# Patient Record
Sex: FEMALE | Race: WHITE | NOT HISPANIC OR LATINO | Employment: UNEMPLOYED | URBAN - METROPOLITAN AREA
[De-identification: names, ages, dates, MRNs, and addresses within clinical notes are randomized per-mention and may not be internally consistent; named-entity substitution may affect disease eponyms.]

---

## 2017-01-08 ENCOUNTER — PATIENT MESSAGE (OUTPATIENT)
Dept: ADMINISTRATIVE | Facility: OTHER | Age: 52
End: 2017-01-08

## 2017-01-08 ENCOUNTER — PATIENT MESSAGE (OUTPATIENT)
Dept: INTERNAL MEDICINE | Facility: CLINIC | Age: 52
End: 2017-01-08

## 2017-02-01 ENCOUNTER — TELEPHONE (OUTPATIENT)
Dept: INTERNAL MEDICINE | Facility: CLINIC | Age: 52
End: 2017-02-01

## 2017-03-26 DIAGNOSIS — Z87.11 HISTORY OF GASTRIC ULCER: ICD-10-CM

## 2017-03-26 RX ORDER — PANTOPRAZOLE SODIUM 40 MG/1
TABLET, DELAYED RELEASE ORAL
Qty: 90 TABLET | Refills: 0 | Status: SHIPPED | OUTPATIENT
Start: 2017-03-26 | End: 2017-05-24 | Stop reason: SDUPTHER

## 2017-05-24 DIAGNOSIS — Z87.11 HISTORY OF GASTRIC ULCER: ICD-10-CM

## 2017-05-24 RX ORDER — PANTOPRAZOLE SODIUM 40 MG/1
TABLET, DELAYED RELEASE ORAL
Qty: 90 TABLET | Refills: 1 | Status: SHIPPED | OUTPATIENT
Start: 2017-05-24 | End: 2017-11-20 | Stop reason: SDUPTHER

## 2017-09-07 DIAGNOSIS — E03.9 HYPOTHYROIDISM (ACQUIRED): ICD-10-CM

## 2017-09-07 RX ORDER — LEVOTHYROXINE SODIUM 125 UG/1
TABLET ORAL
Qty: 90 TABLET | Refills: 0 | Status: SHIPPED | OUTPATIENT
Start: 2017-09-07 | End: 2017-12-06 | Stop reason: SDUPTHER

## 2017-09-07 NOTE — TELEPHONE ENCOUNTER
Please inform patient her refill was sent to her mail order pharmacy.  However, she will need to be seen this month or next as she is due for a recheck including lab for thyroid.

## 2017-09-20 ENCOUNTER — APPOINTMENT (OUTPATIENT)
Dept: RADIOLOGY | Facility: HOSPITAL | Age: 52
End: 2017-09-20
Attending: FAMILY MEDICINE
Payer: COMMERCIAL

## 2017-09-20 ENCOUNTER — OFFICE VISIT (OUTPATIENT)
Dept: INTERNAL MEDICINE | Facility: CLINIC | Age: 52
End: 2017-09-20
Payer: COMMERCIAL

## 2017-09-20 VITALS
BODY MASS INDEX: 43.82 KG/M2 | SYSTOLIC BLOOD PRESSURE: 101 MMHG | OXYGEN SATURATION: 97 % | DIASTOLIC BLOOD PRESSURE: 66 MMHG | WEIGHT: 255.31 LBS | HEART RATE: 89 BPM | TEMPERATURE: 98 F

## 2017-09-20 DIAGNOSIS — M79.605 PAIN OF LEFT LOWER EXTREMITY: ICD-10-CM

## 2017-09-20 DIAGNOSIS — M79.605 PAIN OF LEFT LOWER EXTREMITY: Primary | ICD-10-CM

## 2017-09-20 PROCEDURE — 73590 X-RAY EXAM OF LOWER LEG: CPT | Mod: 26,LT,, | Performed by: RADIOLOGY

## 2017-09-20 PROCEDURE — 99999 PR PBB SHADOW E&M-EST. PATIENT-LVL III: CPT | Mod: PBBFAC,,, | Performed by: FAMILY MEDICINE

## 2017-09-20 PROCEDURE — 99213 OFFICE O/P EST LOW 20 MIN: CPT | Mod: S$GLB,,, | Performed by: FAMILY MEDICINE

## 2017-09-20 PROCEDURE — 73590 X-RAY EXAM OF LOWER LEG: CPT | Mod: TC,PO,LT

## 2017-09-20 PROCEDURE — 3008F BODY MASS INDEX DOCD: CPT | Mod: S$GLB,,, | Performed by: FAMILY MEDICINE

## 2017-09-20 RX ORDER — MORPHINE SULFATE 30 MG/1
30 TABLET, FILM COATED, EXTENDED RELEASE ORAL 2 TIMES DAILY
COMMUNITY
Start: 2016-06-16

## 2017-09-20 NOTE — PROGRESS NOTES
Subjective:       Patient ID: Mable Kerr is a 52 y.o. female.    Chief Complaint: left leg pain    She's had 1 week duration of pain and swelling of left lower anterior leg.  She reports started walking on a treadmill for 45-60 min  3-4 weeks ago. She last walked on treadmill about 2 weeks ago.  She denies fever chills.  Denies leg trauma. Medical history includes fibromyalgia as well as lumbar disc with right leg sciatica      Review of Systems   Constitutional: Negative for chills and fever.   Respiratory: Negative for cough, chest tightness and shortness of breath.    Cardiovascular: Negative for chest pain and palpitations.   Musculoskeletal:        Pain swelling left lower anterior leg   Skin: Negative for rash.       Objective:      Physical Exam   Constitutional: She appears well-developed and well-nourished. No distress.   Cardiovascular: Normal rate and regular rhythm.    Pulmonary/Chest: Effort normal.   Musculoskeletal:   She has tenderness with mild erythema localized to the left lower anterior leg.  She has increased pain with dorsiflexion of her foot.  She has no calf tenderness.       Office Visit on 09/29/2016   Component Date Value Ref Range Status    TSH 09/29/2016 0.961  0.400 - 4.000 uIU/mL Final    WBC 09/29/2016 7.79  3.90 - 12.70 K/uL Final    RBC 09/29/2016 4.58  4.00 - 5.40 M/uL Final    Hemoglobin 09/29/2016 12.7  12.0 - 16.0 g/dL Final    Hematocrit 09/29/2016 40.0  37.0 - 48.5 % Final    MCV 09/29/2016 87  82 - 98 fL Final    MCH 09/29/2016 27.7  27.0 - 31.0 pg Final    MCHC 09/29/2016 31.8* 32.0 - 36.0 % Final    RDW 09/29/2016 13.2  11.5 - 14.5 % Final    Platelets 09/29/2016 341  150 - 350 K/uL Final    MPV 09/29/2016 9.4  9.2 - 12.9 fL Final    Gran # 09/29/2016 4.8  1.8 - 7.7 K/uL Final    Lymph # 09/29/2016 2.3  1.0 - 4.8 K/uL Final    Mono # 09/29/2016 0.5  0.3 - 1.0 K/uL Final    Eos # 09/29/2016 0.3  0.0 - 0.5 K/uL Final    Baso # 09/29/2016 0.02   0.00 - 0.20 K/uL Final    Gran% 09/29/2016 61.1  38.0 - 73.0 % Final    Lymph% 09/29/2016 28.9  18.0 - 48.0 % Final    Mono% 09/29/2016 5.9  4.0 - 15.0 % Final    Eosinophil% 09/29/2016 3.7  0.0 - 8.0 % Final    Basophil% 09/29/2016 0.3  0.0 - 1.9 % Final    Differential Method 09/29/2016 Automated   Final    Sodium 09/29/2016 142  136 - 145 mmol/L Final    Potassium 09/29/2016 3.5  3.5 - 5.1 mmol/L Final    Chloride 09/29/2016 102  95 - 110 mmol/L Final    CO2 09/29/2016 30* 23 - 29 mmol/L Final    Glucose 09/29/2016 105  70 - 110 mg/dL Final    BUN, Bld 09/29/2016 7  6 - 20 mg/dL Final    Creatinine 09/29/2016 0.8  0.5 - 1.4 mg/dL Final    Calcium 09/29/2016 8.9  8.7 - 10.5 mg/dL Final    Total Protein 09/29/2016 6.9  6.0 - 8.4 g/dL Final    Albumin 09/29/2016 3.4* 3.5 - 5.2 g/dL Final    Total Bilirubin 09/29/2016 0.3  0.1 - 1.0 mg/dL Final    Alkaline Phosphatase 09/29/2016 107  55 - 135 U/L Final    AST 09/29/2016 16  10 - 40 U/L Final    ALT 09/29/2016 7* 10 - 44 U/L Final    Anion Gap 09/29/2016 10  8 - 16 mmol/L Final    eGFR if African American 09/29/2016 >60.0  >60 mL/min/1.73 m^2 Final    eGFR if non African American 09/29/2016 >60.0  >60 mL/min/1.73 m^2 Final    Iron 09/29/2016 39  30 - 160 ug/dL Final    Ferritin 09/29/2016 17* 20.0 - 300.0 ng/mL Final    Vitamin B-12 09/29/2016 232  210 - 950 pg/mL Final     Assessment:       1. Pain of left lower extremity        Plan:   X-ray lower leg. prob acute myositis- shin splints- Rest  Medrol dosp and ice tid ov 2wk if not resolved   Call if any worsening or fever    Pain of left lower extremity  -     X-Ray Tibia Fibula 2 View Left; Future; Expected date: 09/20/2017

## 2017-09-21 ENCOUNTER — TELEPHONE (OUTPATIENT)
Dept: INTERNAL MEDICINE | Facility: CLINIC | Age: 52
End: 2017-09-21

## 2017-09-21 DIAGNOSIS — M79.605 PAIN OF LEFT LOWER EXTREMITY: Primary | ICD-10-CM

## 2017-09-21 RX ORDER — METHYLPREDNISOLONE 4 MG/1
TABLET ORAL
Qty: 1 PACKAGE | Refills: 0 | Status: SHIPPED | OUTPATIENT
Start: 2017-09-21 | End: 2017-10-12

## 2017-09-21 NOTE — TELEPHONE ENCOUNTER
----- Message from Hollie Dominique sent at 9/20/2017  4:32 PM CDT -----  Contact: Pt  Pt states that pharmacy has received her prescription..needs RX sent.  Request call back ..[phone]

## 2017-10-10 RX ORDER — MONTELUKAST SODIUM 10 MG/1
TABLET ORAL
Qty: 90 TABLET | Refills: 0 | Status: SHIPPED | OUTPATIENT
Start: 2017-10-10 | End: 2017-12-28 | Stop reason: SDUPTHER

## 2017-11-20 DIAGNOSIS — Z87.11 HISTORY OF GASTRIC ULCER: ICD-10-CM

## 2017-11-20 RX ORDER — PANTOPRAZOLE SODIUM 40 MG/1
TABLET, DELAYED RELEASE ORAL
Qty: 90 TABLET | Refills: 0 | Status: SHIPPED | OUTPATIENT
Start: 2017-11-20 | End: 2018-02-18 | Stop reason: SDUPTHER

## 2017-12-06 ENCOUNTER — TELEPHONE (OUTPATIENT)
Dept: INTERNAL MEDICINE | Facility: CLINIC | Age: 52
End: 2017-12-06

## 2017-12-06 DIAGNOSIS — E03.9 HYPOTHYROIDISM (ACQUIRED): ICD-10-CM

## 2017-12-07 RX ORDER — LEVOTHYROXINE SODIUM 125 UG/1
TABLET ORAL
Qty: 90 TABLET | Refills: 0 | Status: SHIPPED | OUTPATIENT
Start: 2017-12-07 | End: 2018-03-06 | Stop reason: SDUPTHER

## 2017-12-08 NOTE — TELEPHONE ENCOUNTER
Please schedule patient for an office visit.  She is due for labs including thyroid.  Her thyroid medication was refilled again for 90 days.

## 2017-12-11 ENCOUNTER — TELEPHONE (OUTPATIENT)
Dept: INTERNAL MEDICINE | Facility: CLINIC | Age: 52
End: 2017-12-11

## 2017-12-11 NOTE — TELEPHONE ENCOUNTER
----- Message from Triston Driver sent at 12/11/2017  9:59 AM CST -----  Contact: self [phone]  Returning call, please call back at [phone]

## 2017-12-11 NOTE — TELEPHONE ENCOUNTER
Returned the patient phone call. Schedule her office visit and lab work on 12/28/17. Pt verbalized understanding of the appointment information given.

## 2017-12-28 ENCOUNTER — TELEPHONE (OUTPATIENT)
Dept: INTERNAL MEDICINE | Facility: CLINIC | Age: 52
End: 2017-12-28

## 2017-12-28 ENCOUNTER — OFFICE VISIT (OUTPATIENT)
Dept: INTERNAL MEDICINE | Facility: CLINIC | Age: 52
End: 2017-12-28
Payer: COMMERCIAL

## 2017-12-28 VITALS
DIASTOLIC BLOOD PRESSURE: 67 MMHG | SYSTOLIC BLOOD PRESSURE: 107 MMHG | BODY MASS INDEX: 43.52 KG/M2 | WEIGHT: 254.94 LBS | OXYGEN SATURATION: 95 % | TEMPERATURE: 98 F | HEIGHT: 64 IN | HEART RATE: 80 BPM

## 2017-12-28 DIAGNOSIS — Z00.00 WELLNESS EXAMINATION: Primary | ICD-10-CM

## 2017-12-28 DIAGNOSIS — R69 TAKING MEDICATION FOR CHRONIC DISEASE: ICD-10-CM

## 2017-12-28 DIAGNOSIS — B97.89 VIRAL SORE THROAT: ICD-10-CM

## 2017-12-28 DIAGNOSIS — Z23 IMMUNIZATION DUE: ICD-10-CM

## 2017-12-28 DIAGNOSIS — R40.0 DAYTIME SOMNOLENCE: ICD-10-CM

## 2017-12-28 DIAGNOSIS — E03.9 HYPOTHYROIDISM (ACQUIRED): ICD-10-CM

## 2017-12-28 DIAGNOSIS — J02.8 VIRAL SORE THROAT: ICD-10-CM

## 2017-12-28 DIAGNOSIS — Z98.84 HISTORY OF GASTRIC RESTRICTIVE SURGERY: ICD-10-CM

## 2017-12-28 DIAGNOSIS — R06.83 SNORES: ICD-10-CM

## 2017-12-28 DIAGNOSIS — J30.89 CHRONIC NON-SEASONAL ALLERGIC RHINITIS, UNSPECIFIED TRIGGER: ICD-10-CM

## 2017-12-28 LAB
ALBUMIN SERPL BCP-MCNC: 3.4 G/DL
ALP SERPL-CCNC: 110 U/L
ALT SERPL W/O P-5'-P-CCNC: 11 U/L
ANION GAP SERPL CALC-SCNC: 8 MMOL/L
AST SERPL-CCNC: 17 U/L
BASOPHILS # BLD AUTO: 0.03 K/UL
BASOPHILS NFR BLD: 0.5 %
BILIRUB SERPL-MCNC: 0.5 MG/DL
BUN SERPL-MCNC: 7 MG/DL
CALCIUM SERPL-MCNC: 9.3 MG/DL
CHLORIDE SERPL-SCNC: 103 MMOL/L
CHOLEST SERPL-MCNC: 202 MG/DL
CHOLEST/HDLC SERPL: 3.4 {RATIO}
CO2 SERPL-SCNC: 32 MMOL/L
CREAT SERPL-MCNC: 0.8 MG/DL
DIFFERENTIAL METHOD: ABNORMAL
EOSINOPHIL # BLD AUTO: 0.3 K/UL
EOSINOPHIL NFR BLD: 4.7 %
ERYTHROCYTE [DISTWIDTH] IN BLOOD BY AUTOMATED COUNT: 13.9 %
EST. GFR  (AFRICAN AMERICAN): >60 ML/MIN/1.73 M^2
EST. GFR  (NON AFRICAN AMERICAN): >60 ML/MIN/1.73 M^2
FERRITIN SERPL-MCNC: 18 NG/ML
GLUCOSE SERPL-MCNC: 84 MG/DL
HCT VFR BLD AUTO: 39.5 %
HDLC SERPL-MCNC: 60 MG/DL
HDLC SERPL: 29.7 %
HGB BLD-MCNC: 12.3 G/DL
IMM GRANULOCYTES # BLD AUTO: 0.02 K/UL
IMM GRANULOCYTES NFR BLD AUTO: 0.3 %
IRON SERPL-MCNC: 37 UG/DL
LDLC SERPL CALC-MCNC: 119 MG/DL
LYMPHOCYTES # BLD AUTO: 1.4 K/UL
LYMPHOCYTES NFR BLD: 23.8 %
MCH RBC QN AUTO: 25.8 PG
MCHC RBC AUTO-ENTMCNC: 31.1 G/DL
MCV RBC AUTO: 83 FL
MONOCYTES # BLD AUTO: 0.3 K/UL
MONOCYTES NFR BLD: 5.4 %
NEUTROPHILS # BLD AUTO: 3.9 K/UL
NEUTROPHILS NFR BLD: 65.3 %
NONHDLC SERPL-MCNC: 142 MG/DL
NRBC BLD-RTO: 0 /100 WBC
PLATELET # BLD AUTO: 348 K/UL
PMV BLD AUTO: 10.2 FL
POTASSIUM SERPL-SCNC: 4.1 MMOL/L
PROT SERPL-MCNC: 7.2 G/DL
RBC # BLD AUTO: 4.77 M/UL
SATURATED IRON: 9 %
SODIUM SERPL-SCNC: 143 MMOL/L
TOTAL IRON BINDING CAPACITY: 406 UG/DL
TRANSFERRIN SERPL-MCNC: 274 MG/DL
TRIGL SERPL-MCNC: 115 MG/DL
TSH SERPL DL<=0.005 MIU/L-ACNC: 0.61 UIU/ML
VIT B12 SERPL-MCNC: 207 PG/ML
WBC # BLD AUTO: 5.93 K/UL

## 2017-12-28 PROCEDURE — 80061 LIPID PANEL: CPT

## 2017-12-28 PROCEDURE — 99999 PR PBB SHADOW E&M-EST. PATIENT-LVL III: CPT | Mod: PBBFAC,,, | Performed by: FAMILY MEDICINE

## 2017-12-28 PROCEDURE — 85025 COMPLETE CBC W/AUTO DIFF WBC: CPT

## 2017-12-28 PROCEDURE — 82728 ASSAY OF FERRITIN: CPT

## 2017-12-28 PROCEDURE — 80053 COMPREHEN METABOLIC PANEL: CPT

## 2017-12-28 PROCEDURE — 82607 VITAMIN B-12: CPT

## 2017-12-28 PROCEDURE — 99396 PREV VISIT EST AGE 40-64: CPT | Mod: 25,S$GLB,, | Performed by: FAMILY MEDICINE

## 2017-12-28 PROCEDURE — 90471 IMMUNIZATION ADMIN: CPT | Mod: S$GLB,,, | Performed by: FAMILY MEDICINE

## 2017-12-28 PROCEDURE — 90686 IIV4 VACC NO PRSV 0.5 ML IM: CPT | Mod: S$GLB,,, | Performed by: FAMILY MEDICINE

## 2017-12-28 PROCEDURE — 84443 ASSAY THYROID STIM HORMONE: CPT

## 2017-12-28 PROCEDURE — 83540 ASSAY OF IRON: CPT

## 2017-12-28 RX ORDER — TIZANIDINE 4 MG/1
4 TABLET ORAL DAILY PRN
Refills: 0 | COMMUNITY
Start: 2017-10-04

## 2017-12-28 RX ORDER — MONTELUKAST SODIUM 10 MG/1
10 TABLET ORAL DAILY
Qty: 90 TABLET | Refills: 3 | Status: SHIPPED | OUTPATIENT
Start: 2017-12-28 | End: 2018-12-23 | Stop reason: SDUPTHER

## 2017-12-28 RX ORDER — LEVOTHYROXINE SODIUM 125 UG/1
125 TABLET ORAL DAILY
Qty: 90 TABLET | Refills: 0 | Status: CANCELLED | OUTPATIENT
Start: 2017-12-28

## 2017-12-28 RX ORDER — FLUTICASONE PROPIONATE 50 MCG
2 SPRAY, SUSPENSION (ML) NASAL DAILY
Qty: 3 BOTTLE | Refills: 3 | Status: SHIPPED | OUTPATIENT
Start: 2017-12-28

## 2017-12-28 NOTE — PROGRESS NOTES
Patient was instructed to wait 15 minutes before leaving after the vaccines/ injection.  Patient verbalized understanding

## 2017-12-28 NOTE — TELEPHONE ENCOUNTER
----- Message from Rudy Prieto sent at 12/28/2017  8:27 AM CST -----  Contact: Pt  Pt will be 5-10 minutes late due to traffic.

## 2018-02-18 DIAGNOSIS — Z87.11 HISTORY OF GASTRIC ULCER: ICD-10-CM

## 2018-02-18 RX ORDER — PANTOPRAZOLE SODIUM 40 MG/1
TABLET, DELAYED RELEASE ORAL
Qty: 90 TABLET | Refills: 1 | Status: SHIPPED | OUTPATIENT
Start: 2018-02-18 | End: 2018-08-17 | Stop reason: SDUPTHER

## 2018-03-06 DIAGNOSIS — E03.9 HYPOTHYROIDISM (ACQUIRED): ICD-10-CM

## 2018-03-06 RX ORDER — LEVOTHYROXINE SODIUM 125 UG/1
TABLET ORAL
Qty: 90 TABLET | Refills: 2 | Status: SHIPPED | OUTPATIENT
Start: 2018-03-06 | End: 2018-12-02 | Stop reason: SDUPTHER

## 2018-08-17 DIAGNOSIS — Z87.11 HISTORY OF GASTRIC ULCER: ICD-10-CM

## 2018-08-17 RX ORDER — PANTOPRAZOLE SODIUM 40 MG/1
TABLET, DELAYED RELEASE ORAL
Qty: 90 TABLET | Refills: 1 | Status: SHIPPED | OUTPATIENT
Start: 2018-08-17 | End: 2019-02-13 | Stop reason: SDUPTHER

## 2018-12-02 ENCOUNTER — TELEPHONE (OUTPATIENT)
Dept: INTERNAL MEDICINE | Facility: CLINIC | Age: 53
End: 2018-12-02

## 2018-12-02 DIAGNOSIS — E03.9 HYPOTHYROIDISM (ACQUIRED): ICD-10-CM

## 2018-12-03 RX ORDER — LEVOTHYROXINE SODIUM 125 UG/1
TABLET ORAL
Qty: 90 TABLET | Refills: 0 | Status: SHIPPED | OUTPATIENT
Start: 2018-12-03 | End: 2019-03-03 | Stop reason: SDUPTHER

## 2018-12-03 NOTE — TELEPHONE ENCOUNTER
Please schedule pt for annual - her thyroid med was refilled for #90 but due to see me - will get labs at her viist.

## 2018-12-23 DIAGNOSIS — J30.89 CHRONIC NON-SEASONAL ALLERGIC RHINITIS: ICD-10-CM

## 2018-12-24 RX ORDER — MONTELUKAST SODIUM 10 MG/1
TABLET ORAL
Qty: 90 TABLET | Refills: 3 | Status: SHIPPED | OUTPATIENT
Start: 2018-12-24 | End: 2019-12-22

## 2019-02-13 ENCOUNTER — TELEPHONE (OUTPATIENT)
Dept: INTERNAL MEDICINE | Facility: CLINIC | Age: 54
End: 2019-02-13

## 2019-02-13 DIAGNOSIS — Z87.11 HISTORY OF GASTRIC ULCER: ICD-10-CM

## 2019-02-14 RX ORDER — PANTOPRAZOLE SODIUM 40 MG/1
TABLET, DELAYED RELEASE ORAL
Qty: 90 TABLET | Refills: 0 | Status: SHIPPED | OUTPATIENT
Start: 2019-02-14 | End: 2019-05-14 | Stop reason: SDUPTHER

## 2019-02-15 NOTE — TELEPHONE ENCOUNTER
Spoke to patient and advised that an annual exam is needed.  Patient scheduled an appointment for 03/06/2019.

## 2019-02-15 NOTE — TELEPHONE ENCOUNTER
Pls schedule this patient for an annual.  If unable to reach her by telephone you can send her a letter and contact her online.  See prior request to contact patient in December.

## 2019-02-20 ENCOUNTER — PATIENT OUTREACH (OUTPATIENT)
Dept: ADMINISTRATIVE | Facility: HOSPITAL | Age: 54
End: 2019-02-20

## 2019-03-03 DIAGNOSIS — E03.9 HYPOTHYROIDISM (ACQUIRED): ICD-10-CM

## 2019-03-04 RX ORDER — LEVOTHYROXINE SODIUM 125 UG/1
125 TABLET ORAL DAILY
Qty: 90 TABLET | Refills: 0 | Status: SHIPPED | OUTPATIENT
Start: 2019-03-04 | End: 2019-06-04 | Stop reason: SDUPTHER

## 2019-05-14 DIAGNOSIS — Z87.11 HISTORY OF GASTRIC ULCER: ICD-10-CM

## 2019-05-14 RX ORDER — PANTOPRAZOLE SODIUM 40 MG/1
TABLET, DELAYED RELEASE ORAL
Qty: 90 TABLET | Refills: 0 | Status: SHIPPED | OUTPATIENT
Start: 2019-05-14 | End: 2019-08-12 | Stop reason: SDUPTHER

## 2019-06-04 ENCOUNTER — TELEPHONE (OUTPATIENT)
Dept: INTERNAL MEDICINE | Facility: CLINIC | Age: 54
End: 2019-06-04

## 2019-06-04 DIAGNOSIS — Z13.6 ENCOUNTER FOR LIPID SCREENING FOR CARDIOVASCULAR DISEASE: ICD-10-CM

## 2019-06-04 DIAGNOSIS — Z12.31 ENCOUNTER FOR SCREENING MAMMOGRAM FOR BREAST CANCER: Primary | ICD-10-CM

## 2019-06-04 DIAGNOSIS — Z13.220 ENCOUNTER FOR LIPID SCREENING FOR CARDIOVASCULAR DISEASE: ICD-10-CM

## 2019-06-04 DIAGNOSIS — E03.9 HYPOTHYROIDISM (ACQUIRED): ICD-10-CM

## 2019-06-04 RX ORDER — LEVOTHYROXINE SODIUM 125 UG/1
TABLET ORAL
Qty: 30 TABLET | Refills: 0 | Status: SHIPPED | OUTPATIENT
Start: 2019-06-04 | End: 2019-07-03 | Stop reason: SDUPTHER

## 2019-06-04 NOTE — TELEPHONE ENCOUNTER
Tried to contact pt to inform of medication being sent to pharmacy and to assist with scheduling labs and OV. Number not working at this time. Letter has been sent to last known address.

## 2019-06-04 NOTE — TELEPHONE ENCOUNTER
Patient needs to be seen for recheck thyroid - only one month sent to mail order.  We can get labs at her visit.

## 2019-07-03 ENCOUNTER — OFFICE VISIT (OUTPATIENT)
Dept: INTERNAL MEDICINE | Facility: CLINIC | Age: 54
End: 2019-07-03
Payer: COMMERCIAL

## 2019-07-03 VITALS
DIASTOLIC BLOOD PRESSURE: 71 MMHG | HEART RATE: 77 BPM | BODY MASS INDEX: 44.58 KG/M2 | TEMPERATURE: 98 F | WEIGHT: 259.69 LBS | SYSTOLIC BLOOD PRESSURE: 114 MMHG | OXYGEN SATURATION: 94 %

## 2019-07-03 DIAGNOSIS — Z13.220 ENCOUNTER FOR LIPID SCREENING FOR CARDIOVASCULAR DISEASE: ICD-10-CM

## 2019-07-03 DIAGNOSIS — Z12.11 SCREENING FOR COLORECTAL CANCER: ICD-10-CM

## 2019-07-03 DIAGNOSIS — E03.9 HYPOTHYROIDISM (ACQUIRED): ICD-10-CM

## 2019-07-03 DIAGNOSIS — E03.9 HYPOTHYROIDISM, UNSPECIFIED TYPE: ICD-10-CM

## 2019-07-03 DIAGNOSIS — Z12.12 SCREENING FOR COLORECTAL CANCER: ICD-10-CM

## 2019-07-03 DIAGNOSIS — Z00.00 ROUTINE PHYSICAL EXAMINATION: Primary | ICD-10-CM

## 2019-07-03 DIAGNOSIS — M79.672 FOOT PAIN, LEFT: ICD-10-CM

## 2019-07-03 DIAGNOSIS — Z13.6 ENCOUNTER FOR LIPID SCREENING FOR CARDIOVASCULAR DISEASE: ICD-10-CM
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
## 2019-07-03 PROBLEM — K64.9 HEMORRHOIDS WITHOUT COMPLICATION: Status: ACTIVE | Noted: 2019-07-03

## 2019-07-03 PROBLEM — K58.9 IBS (IRRITABLE BOWEL SYNDROME): Status: ACTIVE | Noted: 2019-07-03

## 2019-07-03 PROBLEM — J30.9 ALLERGIC RHINITIS: Status: ACTIVE | Noted: 2019-07-03

## 2019-07-03 PROBLEM — G47.30 SLEEP APNEA: Status: ACTIVE | Noted: 2019-07-03

## 2019-07-03 PROBLEM — J34.3 HYPERTROPHY OF NASAL TURBINATES: Status: ACTIVE | Noted: 2019-07-03

## 2019-07-03 PROBLEM — K25.9 GASTRIC ULCERATION: Status: ACTIVE | Noted: 2019-07-03

## 2019-07-03 PROBLEM — F32.A DEPRESSION: Status: ACTIVE | Noted: 2019-07-03

## 2019-07-03 PROBLEM — G25.81 RESTLESS LEGS SYNDROME: Status: ACTIVE | Noted: 2019-07-03

## 2019-07-03 PROBLEM — M19.90 DJD (DEGENERATIVE JOINT DISEASE): Status: ACTIVE | Noted: 2019-07-03

## 2019-07-03 PROBLEM — J34.89 POSTERIOR RHINORRHEA: Status: ACTIVE | Noted: 2019-07-03

## 2019-07-03 PROBLEM — M79.7 FIBROMYALGIA: Status: ACTIVE | Noted: 2019-07-03

## 2019-07-03 PROBLEM — K12.1 STOMATITIS: Status: ACTIVE | Noted: 2019-07-03

## 2019-07-03 LAB
ALBUMIN SERPL BCP-MCNC: 3.5 G/DL (ref 3.5–5.2)
ALP SERPL-CCNC: 114 U/L (ref 55–135)
ALT SERPL W/O P-5'-P-CCNC: 11 U/L (ref 10–44)
ANION GAP SERPL CALC-SCNC: 9 MMOL/L (ref 8–16)
AST SERPL-CCNC: 17 U/L (ref 10–40)
BASOPHILS # BLD AUTO: 0.05 K/UL (ref 0–0.2)
BASOPHILS NFR BLD: 0.7 % (ref 0–1.9)
BILIRUB SERPL-MCNC: 0.4 MG/DL (ref 0.1–1)
BUN SERPL-MCNC: 8 MG/DL (ref 6–20)
CALCIUM SERPL-MCNC: 9.4 MG/DL (ref 8.7–10.5)
CHLORIDE SERPL-SCNC: 100 MMOL/L (ref 95–110)
CHOLEST SERPL-MCNC: 197 MG/DL (ref 120–199)
CHOLEST/HDLC SERPL: 3.2 {RATIO} (ref 2–5)
CO2 SERPL-SCNC: 32 MMOL/L (ref 23–29)
CREAT SERPL-MCNC: 0.8 MG/DL (ref 0.5–1.4)
DIFFERENTIAL METHOD: ABNORMAL
EOSINOPHIL # BLD AUTO: 0.4 K/UL (ref 0–0.5)
EOSINOPHIL NFR BLD: 5.7 % (ref 0–8)
ERYTHROCYTE [DISTWIDTH] IN BLOOD BY AUTOMATED COUNT: 14.3 % (ref 11.5–14.5)
EST. GFR  (AFRICAN AMERICAN): >60 ML/MIN/1.73 M^2
EST. GFR  (NON AFRICAN AMERICAN): >60 ML/MIN/1.73 M^2
GLUCOSE SERPL-MCNC: 87 MG/DL (ref 70–110)
HCT VFR BLD AUTO: 37 % (ref 37–48.5)
HDLC SERPL-MCNC: 61 MG/DL (ref 40–75)
HDLC SERPL: 31 % (ref 20–50)
HGB BLD-MCNC: 11.2 G/DL (ref 12–16)
IMM GRANULOCYTES # BLD AUTO: 0.03 K/UL (ref 0–0.04)
IMM GRANULOCYTES NFR BLD AUTO: 0.4 % (ref 0–0.5)
LDLC SERPL CALC-MCNC: 103.4 MG/DL (ref 63–159)
LYMPHOCYTES # BLD AUTO: 2.4 K/UL (ref 1–4.8)
LYMPHOCYTES NFR BLD: 33.1 % (ref 18–48)
MCH RBC QN AUTO: 24.6 PG (ref 27–31)
MCHC RBC AUTO-ENTMCNC: 30.3 G/DL (ref 32–36)
MCV RBC AUTO: 81 FL (ref 82–98)
MONOCYTES # BLD AUTO: 0.5 K/UL (ref 0.3–1)
MONOCYTES NFR BLD: 6.4 % (ref 4–15)
NEUTROPHILS # BLD AUTO: 3.9 K/UL (ref 1.8–7.7)
NEUTROPHILS NFR BLD: 53.7 % (ref 38–73)
NONHDLC SERPL-MCNC: 136 MG/DL
NRBC BLD-RTO: 0 /100 WBC
PLATELET # BLD AUTO: 394 K/UL (ref 150–350)
PMV BLD AUTO: 10.2 FL (ref 9.2–12.9)
POTASSIUM SERPL-SCNC: 4.4 MMOL/L (ref 3.5–5.1)
PROT SERPL-MCNC: 7.3 G/DL (ref 6–8.4)
RBC # BLD AUTO: 4.56 M/UL (ref 4–5.4)
SODIUM SERPL-SCNC: 141 MMOL/L (ref 136–145)
TRIGL SERPL-MCNC: 163 MG/DL (ref 30–150)
TSH SERPL DL<=0.005 MIU/L-ACNC: 1.01 UIU/ML (ref 0.4–4)
WBC # BLD AUTO: 7.17 K/UL (ref 3.9–12.7)

## 2019-07-03 PROCEDURE — 99999 PR PBB SHADOW E&M-EST. PATIENT-LVL III: CPT | Mod: PBBFAC,,, | Performed by: FAMILY MEDICINE

## 2019-07-03 PROCEDURE — 99396 PR PREVENTIVE VISIT,EST,40-64: ICD-10-PCS | Mod: AT,S$GLB,, | Performed by: FAMILY MEDICINE

## 2019-07-03 PROCEDURE — 99999 PR PBB SHADOW E&M-EST. PATIENT-LVL III: ICD-10-PCS | Mod: PBBFAC,,, | Performed by: FAMILY MEDICINE

## 2019-07-03 PROCEDURE — 80061 LIPID PANEL: CPT

## 2019-07-03 PROCEDURE — 85025 COMPLETE CBC W/AUTO DIFF WBC: CPT

## 2019-07-03 PROCEDURE — 84443 ASSAY THYROID STIM HORMONE: CPT

## 2019-07-03 PROCEDURE — 80053 COMPREHEN METABOLIC PANEL: CPT

## 2019-07-03 PROCEDURE — 99396 PREV VISIT EST AGE 40-64: CPT | Mod: AT,S$GLB,, | Performed by: FAMILY MEDICINE

## 2019-07-03 RX ORDER — AMOXICILLIN AND CLAVULANATE POTASSIUM 875; 125 MG/1; MG/1
TABLET, FILM COATED ORAL
Refills: 0 | COMMUNITY
Start: 2019-04-10 | End: 2020-09-03

## 2019-07-03 RX ORDER — LEVOTHYROXINE SODIUM 125 UG/1
TABLET ORAL
Qty: 30 TABLET | Refills: 2 | Status: SHIPPED | OUTPATIENT
Start: 2019-07-03 | End: 2019-08-12 | Stop reason: SDUPTHER

## 2019-07-03 NOTE — PROGRESS NOTES
Chief Complaint: Annual Exam    HPI    ANNUAL EXAM:  Preventative Health Checklist 24-64 years of age    Mable Kerr presents today with no complaints for an annual exam.    Reports h/o of colonoscopy for rectal bleeding  Continues to occasionally have BRBPR d/t hemorrhoids  Polyps found and removed but uncertain pathology     Notes cough and several dental procedures recently and sleeping with mouth open    States having fallen and went to [facility] in [place]   xrays wnl  Onset couple of weeks ago  Tripped and fell on flat floor  Continues to experience pain and swelling    On 125micog synthroid for several years  No recent dosage change  No adverse side effects  Reports compliance to meds    Counseling given on 2019    Labs/Screenings:    Females:  Pap (if sexually active and has cervix): last pap , sees gyn  Mammogram (consider after 40):  Due, order placed by pcp but had one recently done by gyn in 2019    Males/Females:  Total Blood Cholesterol:  202,   Fecal Occult Blood (50+):  due  Colonoscopy (50+):  due  Assess for Problem Drinking:  reviewed  HCV Screening ( -):      Immunizations:  Tetanus-Diptheria (Td) Booster:  UTD  Tdap:   Rubella (females, childbearing age):   Shingles Vaccine (60+):    PNV (if indicated): n/a  Flu: UTD    Review of Systems     Pt has completed a full 14 system review. All items are negative except as indicated.  All pertinent positives and negatives as in HPI    Objective:   /71   Pulse 77   Temp 98.2 °F (36.8 °C) (Oral)   Wt 117.8 kg (259 lb 11.2 oz)   SpO2 (!) 94%   BMI 44.58 kg/m²     Physical Exam   Constitutional: She is oriented to person, place, and time. She appears well-nourished. No distress.   HENT:   Head: Normocephalic and atraumatic.   Mouth/Throat: Oropharynx is clear and moist.   Eyes: Conjunctivae and EOM are normal. No scleral icterus.   Neck: Normal range of motion. Neck supple. No thyromegaly present.    Cardiovascular: Normal rate and regular rhythm.   Murmur heard.  Pulmonary/Chest: Effort normal and breath sounds normal. She has no wheezes.   Abdominal: Soft. Bowel sounds are normal. There is no tenderness.   obese   Musculoskeletal: Normal range of motion. She exhibits no edema or deformity.   Neurological: She is alert and oriented to person, place, and time.   Skin: Skin is warm and dry. No erythema.   Psychiatric: She has a normal mood and affect. Her behavior is normal.   Vitals reviewed.    Assessment:     1. Routine physical examination    2. Screening for colorectal cancer    3. Hypothyroidism, unspecified type    4. Hypothyroidism (acquired)      Plan:     Problem List Items Addressed This Visit        Endocrine    Hypothyroidism    Current Assessment & Plan     Cont current dosage. F/u labs         Relevant Medications    SYNTHROID 125 mcg tablet      Other Visit Diagnoses     Routine physical examination    -  Primary    Relevant Orders    Comprehensive metabolic panel    CBC auto differential    Screening for colorectal cancer        Relevant Orders    Fecal Immunochemical Test (iFOBT)    Hypothyroidism (acquired)        Relevant Medications    SYNTHROID 125 mcg tablet      [facility]  Monitor foot pain and swelling. If no improvement after one month, consider re-xray and physical therapy  Counseling:  Nutrition: Encouraged to take 5-10 servings fruits and vegetables daily   Exercise:  Physical activity recommendations reviewed and given hand out  Avoid Tobacco Use: reviewed  Avoid ETOH/Drug Use:  reviewed  STD Prevention/Abstinence:   Avoid High Risk Behavior:   Unintended Pregnancy:    Lap-shoulder Belts:  Yes  No text/talk while driving: Reviewed  Bike/ATV Motorcycle Helmets:  N/A  Smoke Detector:  yes  Safe Storage/Removal of Firearms: reviewed    Calcium Intake (females):  Calcium recommendations given with handout  Regular Dental Visits:  yes  Floss, Brush and Fluoride: yes    Follow up in  about 6 months (around 1/3/2020).

## 2019-07-08 ENCOUNTER — TELEPHONE (OUTPATIENT)
Dept: INTERNAL MEDICINE | Facility: CLINIC | Age: 54
End: 2019-07-08

## 2019-07-08 ENCOUNTER — PATIENT OUTREACH (OUTPATIENT)
Dept: ADMINISTRATIVE | Facility: HOSPITAL | Age: 54
End: 2019-07-08

## 2019-07-08 NOTE — PROGRESS NOTES
Contacted patient to follow up on overdue fit kit. Patient states she will be turning in fit kit tomorrow morning.

## 2019-07-08 NOTE — TELEPHONE ENCOUNTER
Spoke with pt and informed of recent lipid, TSH, CBC, and CMP results. Pt verbalized understanding of information.

## 2019-07-08 NOTE — TELEPHONE ENCOUNTER
----- Message from Abdiel Rebolledo sent at 7/8/2019  3:21 PM CDT -----  Contact: pt   .Type:  Patient Returning Call    Who Called: pt   Who Left Message for Patient: not sure   Does the patient know what this is regarding?:test results   Would the patient rather a call back or a response via [facility]? Callback   Best Call Back Number: ..[phone]    Additional Information:

## 2019-07-17 ENCOUNTER — PATIENT OUTREACH (OUTPATIENT)
Dept: ADMINISTRATIVE | Facility: HOSPITAL | Age: 54
End: 2019-07-17

## 2019-08-12 DIAGNOSIS — E03.9 HYPOTHYROIDISM (ACQUIRED): ICD-10-CM

## 2019-08-12 DIAGNOSIS — Z87.11 HISTORY OF GASTRIC ULCER: ICD-10-CM

## 2019-08-12 RX ORDER — LEVOTHYROXINE SODIUM 125 UG/1
TABLET ORAL
Qty: 90 TABLET | Refills: 3 | Status: SHIPPED | OUTPATIENT
Start: 2019-08-12 | End: 2020-09-03 | Stop reason: SDUPTHER

## 2019-08-12 NOTE — TELEPHONE ENCOUNTER
----- Message from Tasneem Dominique sent at 8/12/2019 10:53 AM CDT -----  Contact: Patient   Type:  RX Refill Request    Who Called: Patient   Refill or New Rx: Refill   RX Name and Strength: Synthroid 120 mg  How is the patient currently taking it? (ex. 1XDay): 1x day  Is this a 30 day or 90 day RX: 90  [facility] [address] [address]  Phone: [phone] Fax: [phone]  Local or Mail Order: Local  Ordering Provider: Dr. Logan  Would the patient rather a call back or a response via [facility]? Call back   Best Call Back Number: Please call her at [phone]  Additional Information: n/a

## 2019-08-12 NOTE — TELEPHONE ENCOUNTER
----- Message from Tasneem Dominique sent at 8/12/2019 10:53 AM CDT -----  Contact: Patient   Type:  RX Refill Request    Who Called: Patient   Refill or New Rx: Refill   RX Name and Strength: Synthroid 120 mg  How is the patient currently taking it? (ex. 1XDay): 1x day  Is this a 30 day or 90 day RX: 90  [facility] [address] [address]  Phone: [phone] Fax: [phone]  Local or Mail Order: Local  Ordering Provider: Dr. Logan  Would the patient rather a call back or a response via [facility]? Call back   Best Call Back Number: Please call her at [phone]  Additional Information: n/a

## 2019-08-12 NOTE — TELEPHONE ENCOUNTER
Last OV 07/03/19    SYNTHROID 125 mcg tablet is not on the preferred formulary for the patient's insurance plan. Insurance suggest pt be switched to Levothyroxine 125 mcg.    Pt states it [facility] and has not picked up the original script from [facility]. Contacted [email or web address] to cancel script.

## 2019-08-13 RX ORDER — PANTOPRAZOLE SODIUM 40 MG/1
TABLET, DELAYED RELEASE ORAL
Qty: 90 TABLET | Refills: 0 | Status: SHIPPED | OUTPATIENT
Start: 2019-08-13 | End: 2019-11-11 | Stop reason: SDUPTHER

## 2019-08-13 NOTE — TELEPHONE ENCOUNTER
Pt informed medication has been sent to requestd pharmacy. Pt verbalized understanding of information.

## 2019-11-11 DIAGNOSIS — Z87.11 HISTORY OF GASTRIC ULCER: ICD-10-CM

## 2019-11-11 RX ORDER — PANTOPRAZOLE SODIUM 40 MG/1
TABLET, DELAYED RELEASE ORAL
Qty: 90 TABLET | Refills: 2 | Status: SHIPPED | OUTPATIENT
Start: 2019-11-11 | End: 2020-09-16 | Stop reason: SDUPTHER

## 2019-12-20 DIAGNOSIS — J30.89 CHRONIC NON-SEASONAL ALLERGIC RHINITIS: ICD-10-CM

## 2019-12-22 RX ORDER — MONTELUKAST SODIUM 10 MG/1
TABLET ORAL
Qty: 90 TABLET | Refills: 4 | Status: SHIPPED | OUTPATIENT
Start: 2019-12-22 | End: 2021-03-18

## 2020-09-03 ENCOUNTER — OFFICE VISIT (OUTPATIENT)
Dept: INTERNAL MEDICINE | Facility: CLINIC | Age: 55
End: 2020-09-03
Payer: COMMERCIAL

## 2020-09-03 ENCOUNTER — LAB VISIT (OUTPATIENT)
Dept: LAB | Facility: HOSPITAL | Age: 55
End: 2020-09-03
Attending: FAMILY MEDICINE
Payer: COMMERCIAL

## 2020-09-03 VITALS
HEART RATE: 83 BPM | DIASTOLIC BLOOD PRESSURE: 72 MMHG | BODY MASS INDEX: 43.42 KG/M2 | HEIGHT: 64 IN | SYSTOLIC BLOOD PRESSURE: 120 MMHG | RESPIRATION RATE: 20 BRPM | TEMPERATURE: 98 F | OXYGEN SATURATION: 96 % | WEIGHT: 254.31 LBS

## 2020-09-03 DIAGNOSIS — E66.01 MORBID OBESITY: ICD-10-CM

## 2020-09-03 DIAGNOSIS — Z98.84 HISTORY OF BARIATRIC SURGERY: ICD-10-CM

## 2020-09-03 DIAGNOSIS — E03.9 HYPOTHYROIDISM (ACQUIRED): ICD-10-CM

## 2020-09-03 DIAGNOSIS — E78.1 HIGH BLOOD TRIGLYCERIDES: ICD-10-CM

## 2020-09-03 DIAGNOSIS — Z00.00 ROUTINE PHYSICAL EXAMINATION: Primary | ICD-10-CM

## 2020-09-03 DIAGNOSIS — Z11.59 NEED FOR HEPATITIS C SCREENING TEST: ICD-10-CM

## 2020-09-03 DIAGNOSIS — Z11.4 ENCOUNTER FOR SCREENING FOR HIV: ICD-10-CM

## 2020-09-03 DIAGNOSIS — E03.9 HYPOTHYROIDISM, UNSPECIFIED TYPE: ICD-10-CM

## 2020-09-03 LAB
25(OH)D3+25(OH)D2 SERPL-MCNC: 20 NG/ML (ref 30–96)
ALBUMIN SERPL BCP-MCNC: 3.2 G/DL (ref 3.5–5.2)
ALP SERPL-CCNC: 133 U/L (ref 55–135)
ALT SERPL W/O P-5'-P-CCNC: 12 U/L (ref 10–44)
ANION GAP SERPL CALC-SCNC: 9 MMOL/L (ref 8–16)
AST SERPL-CCNC: 17 U/L (ref 10–40)
BILIRUB SERPL-MCNC: 0.4 MG/DL (ref 0.1–1)
BUN SERPL-MCNC: 8 MG/DL (ref 6–20)
CALCIUM SERPL-MCNC: 8.7 MG/DL (ref 8.7–10.5)
CHLORIDE SERPL-SCNC: 103 MMOL/L (ref 95–110)
CHOLEST SERPL-MCNC: 255 MG/DL (ref 120–199)
CHOLEST/HDLC SERPL: 5.7 {RATIO} (ref 2–5)
CO2 SERPL-SCNC: 30 MMOL/L (ref 23–29)
CREAT SERPL-MCNC: 0.8 MG/DL (ref 0.5–1.4)
EST. GFR  (AFRICAN AMERICAN): >60 ML/MIN/1.73 M^2
EST. GFR  (NON AFRICAN AMERICAN): >60 ML/MIN/1.73 M^2
GLUCOSE SERPL-MCNC: 84 MG/DL (ref 70–110)
HDLC SERPL-MCNC: 45 MG/DL (ref 40–75)
HDLC SERPL: 17.6 % (ref 20–50)
LDLC SERPL CALC-MCNC: 151.2 MG/DL (ref 63–159)
NONHDLC SERPL-MCNC: 210 MG/DL
POTASSIUM SERPL-SCNC: 3.5 MMOL/L (ref 3.5–5.1)
PROT SERPL-MCNC: 6.8 G/DL (ref 6–8.4)
SODIUM SERPL-SCNC: 142 MMOL/L (ref 136–145)
TRIGL SERPL-MCNC: 294 MG/DL (ref 30–150)
TSH SERPL DL<=0.005 MIU/L-ACNC: 3.24 UIU/ML (ref 0.4–4)
VIT B12 SERPL-MCNC: 317 PG/ML (ref 210–950)

## 2020-09-03 PROCEDURE — 82607 VITAMIN B-12: CPT

## 2020-09-03 PROCEDURE — 86703 HIV-1/HIV-2 1 RESULT ANTBDY: CPT

## 2020-09-03 PROCEDURE — 82306 VITAMIN D 25 HYDROXY: CPT

## 2020-09-03 PROCEDURE — 99999 PR PBB SHADOW E&M-EST. PATIENT-LVL IV: ICD-10-PCS | Mod: PBBFAC,,, | Performed by: FAMILY MEDICINE

## 2020-09-03 PROCEDURE — 36415 COLL VENOUS BLD VENIPUNCTURE: CPT

## 2020-09-03 PROCEDURE — 86803 HEPATITIS C AB TEST: CPT

## 2020-09-03 PROCEDURE — 99396 PR PREVENTIVE VISIT,EST,40-64: ICD-10-PCS | Mod: S$GLB,,, | Performed by: FAMILY MEDICINE

## 2020-09-03 PROCEDURE — 99999 PR PBB SHADOW E&M-EST. PATIENT-LVL IV: CPT | Mod: PBBFAC,,, | Performed by: FAMILY MEDICINE

## 2020-09-03 PROCEDURE — 80053 COMPREHEN METABOLIC PANEL: CPT

## 2020-09-03 PROCEDURE — 85025 COMPLETE CBC W/AUTO DIFF WBC: CPT

## 2020-09-03 PROCEDURE — 84443 ASSAY THYROID STIM HORMONE: CPT

## 2020-09-03 PROCEDURE — 80061 LIPID PANEL: CPT

## 2020-09-03 PROCEDURE — 99396 PREV VISIT EST AGE 40-64: CPT | Mod: S$GLB,,, | Performed by: FAMILY MEDICINE

## 2020-09-03 RX ORDER — LEVOTHYROXINE SODIUM 125 UG/1
TABLET ORAL
Qty: 90 TABLET | Refills: 3 | Status: SHIPPED | OUTPATIENT
Start: 2020-09-03 | End: 2020-09-04 | Stop reason: SDUPTHER

## 2020-09-03 NOTE — PROGRESS NOTES
"Chief Complaint: Annual Exam (pt requesting medication to be refilled)    HPI    ANNUAL EXAM:  Preventative Health Checklist 24-64 years of age    Mable Kerr presents today with no complaints for an annual exam.      Age appropriate counseling on vaccinations and preventative health given on 09/03/2020    Here for annual  No concerns  Needs refills     fmh maternal lobectomy, noncancerous   Also with MI in dec at 80  Review of Systems     Pt has completed a full 14 system review. All items are negative except as indicated.    Objective:   /72 (BP Location: Left arm, Patient Position: Sitting, BP Method: Large (Manual))   Pulse 83   Temp 98.2 °F (36.8 °C) (Tympanic)   Resp 20   Ht 5' 4" (1.626 m)   Wt 115.4 kg (254 lb 4.8 oz)   SpO2 96%   BMI 43.65 kg/m²     Physical Exam  Vitals signs reviewed.   Constitutional:       General: She is not in acute distress.     Appearance: Normal appearance. She is well-developed. She is not ill-appearing or toxic-appearing.   HENT:      Head: Normocephalic and atraumatic.      Right Ear: Hearing normal.      Left Ear: Hearing normal.   Neck:      Musculoskeletal: Normal range of motion.   Cardiovascular:      Rate and Rhythm: Normal rate.   Pulmonary:      Effort: Pulmonary effort is normal. No respiratory distress.   Abdominal:      Palpations: Abdomen is soft.      Comments: Abdominal obesity   Musculoskeletal: Normal range of motion.   Skin:     General: Skin is warm and dry.   Neurological:      Mental Status: She is alert and oriented to person, place, and time.   Psychiatric:         Behavior: Behavior normal.       Assessment:     1. Routine physical examination    2. Hypothyroidism (acquired)    3. Morbid obesity    4. Encounter for screening for HIV    5. Need for hepatitis C screening test    6. High blood triglycerides    7. Hypothyroidism, unspecified type    8. History of bariatric surgery      Plan:     Problem List Items Addressed This Visit        " Endocrine    Hypothyroidism    Current Assessment & Plan     Reports noncompliance to meds for last 3 days         Relevant Medications    SYNTHROID 125 mcg tablet    Morbid obesity    Current Assessment & Plan     Advised of USPSTF recommendation for exercise. Goals for diet and exercise established.           Relevant Orders    TSH    History of bariatric surgery    Current Assessment & Plan     Gastric sleeve in [place] in 2015         Relevant Orders    Comprehensive metabolic panel    CBC auto differential    Vitamin D    Vitamin B12      Other Visit Diagnoses     Routine physical examination    -  Primary    Hypothyroidism (acquired)        Relevant Medications    SYNTHROID 125 mcg tablet    Other Relevant Orders    TSH    Encounter for screening for HIV        Relevant Orders    HIV 1/2 Ag/Ab (4th Gen)    Need for hepatitis C screening test        Relevant Orders    Hepatitis C Antibody    High blood triglycerides        Relevant Orders    Lipid Panel          Counseling:  Nutrition: Encouraged to take 5-10 servings fruits and vegetables daily   Exercise:  Physical activity recommendations reviewed   Avoid Tobacco Use: reviewed  Avoid ETOH/Drug Use:  reviewed  STD Prevention/Abstinence:   Avoid High Risk Behavior:   Unintended Pregnancy:    Lap-shoulder Belts:  Yes  No text/talk while driving: Reviewed  Bike/ATV Motorcycle Helmets:  N/A  Smoke Detector:  yes  Safe Storage/Removal of Firearms: reviewed    Calcium Intake (females):  Calcium recommendations   Regular Dental Visits:  yes  Floss, Brush and Fluoride: yes    Follow up in about 1 year (around 9/3/2021) for nonfasting, sign aileen mammo, sign aileen pap, aileen dexa.

## 2020-09-04 ENCOUNTER — TELEPHONE (OUTPATIENT)
Dept: INTERNAL MEDICINE | Facility: CLINIC | Age: 55
End: 2020-09-04

## 2020-09-04 ENCOUNTER — PATIENT MESSAGE (OUTPATIENT)
Dept: INTERNAL MEDICINE | Facility: CLINIC | Age: 55
End: 2020-09-04

## 2020-09-04 DIAGNOSIS — R79.89 ELEVATED PLATELET COUNT: Primary | ICD-10-CM

## 2020-09-04 DIAGNOSIS — E78.2 HYPERLIPIDEMIA, MIXED: ICD-10-CM

## 2020-09-04 DIAGNOSIS — E55.9 VITAMIN D INSUFFICIENCY: ICD-10-CM

## 2020-09-04 DIAGNOSIS — E03.9 HYPOTHYROIDISM (ACQUIRED): ICD-10-CM

## 2020-09-04 LAB
BASOPHILS # BLD AUTO: 0.04 K/UL (ref 0–0.2)
BASOPHILS NFR BLD: 0.5 % (ref 0–1.9)
DIFFERENTIAL METHOD: ABNORMAL
EOSINOPHIL # BLD AUTO: 0.3 K/UL (ref 0–0.5)
EOSINOPHIL NFR BLD: 3.9 % (ref 0–8)
ERYTHROCYTE [DISTWIDTH] IN BLOOD BY AUTOMATED COUNT: 17.5 % (ref 11.5–14.5)
HCT VFR BLD AUTO: 34.7 % (ref 37–48.5)
HCV AB SERPL QL IA: NEGATIVE
HGB BLD-MCNC: 9.8 G/DL (ref 12–16)
HIV 1+2 AB+HIV1 P24 AG SERPL QL IA: NEGATIVE
IMM GRANULOCYTES # BLD AUTO: 0.03 K/UL (ref 0–0.04)
IMM GRANULOCYTES NFR BLD AUTO: 0.4 % (ref 0–0.5)
LYMPHOCYTES # BLD AUTO: 3.1 K/UL (ref 1–4.8)
LYMPHOCYTES NFR BLD: 36.9 % (ref 18–48)
MCH RBC QN AUTO: 22.4 PG (ref 27–31)
MCHC RBC AUTO-ENTMCNC: 28.2 G/DL (ref 32–36)
MCV RBC AUTO: 79 FL (ref 82–98)
MONOCYTES # BLD AUTO: 0.5 K/UL (ref 0.3–1)
MONOCYTES NFR BLD: 5.9 % (ref 4–15)
NEUTROPHILS # BLD AUTO: 4.5 K/UL (ref 1.8–7.7)
NEUTROPHILS NFR BLD: 52.4 % (ref 38–73)
NRBC BLD-RTO: 0 /100 WBC
PLATELET # BLD AUTO: 419 K/UL (ref 150–350)
PMV BLD AUTO: 9.4 FL (ref 9.2–12.9)
RBC # BLD AUTO: 4.37 M/UL (ref 4–5.4)
WBC # BLD AUTO: 8.5 K/UL (ref 3.9–12.7)

## 2020-09-04 RX ORDER — LEVOTHYROXINE SODIUM 125 UG/1
TABLET ORAL
Qty: 90 TABLET | Refills: 3 | Status: SHIPPED | OUTPATIENT
Start: 2020-09-04 | End: 2020-09-08 | Stop reason: SDUPTHER

## 2020-09-04 NOTE — TELEPHONE ENCOUNTER
----- Message from Chuckie Snyder sent at 9/4/2020 12:36 PM CDT -----  Regarding: Medication question  Please call Mable to discuss a medication question she has [phone].

## 2020-09-04 NOTE — PROGRESS NOTES
abNormal labs, worsening anemia and elevated platelets since one year ago and prior years.   elev TC but no meds indicated at this time. Encourage lifestyle changes with diet and exercise. If no improvement, then meds  Low vit d. rec 1,000 to 1,200 mg/day of calcium and at least 800 to 1,000 IU/day of vitamin D  Repeat labs in 6 months. Orders placed

## 2020-09-08 DIAGNOSIS — E03.9 HYPOTHYROIDISM (ACQUIRED): ICD-10-CM

## 2020-09-08 RX ORDER — LEVOTHYROXINE SODIUM 125 UG/1
TABLET ORAL
Qty: 30 TABLET | Refills: 0 | Status: SHIPPED | OUTPATIENT
Start: 2020-09-08 | End: 2020-09-10 | Stop reason: SDUPTHER

## 2020-09-08 NOTE — TELEPHONE ENCOUNTER
Advised pt of abNormal labs, worsening anemia and elevated platelets since one year ago and prior years. Low vit d., elev TC but no meds indicated at this time. Encourage lifestyle changes with diet and exercise. If no improvement, then meds. Pt gave a verbal understanding of lab results.     [facility]

## 2020-09-10 DIAGNOSIS — E03.9 HYPOTHYROIDISM (ACQUIRED): ICD-10-CM

## 2020-09-10 RX ORDER — LEVOTHYROXINE SODIUM 100 UG/1
100 TABLET ORAL
Qty: 90 TABLET | Refills: 2 | Status: SHIPPED | OUTPATIENT
Start: 2020-09-10 | End: 2020-09-11 | Stop reason: SDUPTHER

## 2020-09-11 DIAGNOSIS — E03.9 HYPOTHYROIDISM (ACQUIRED): ICD-10-CM

## 2020-09-11 RX ORDER — LEVOTHYROXINE SODIUM 100 UG/1
100 TABLET ORAL
Qty: 30 TABLET | Refills: 0 | Status: SHIPPED | OUTPATIENT
Start: 2020-09-11 | End: 2020-10-11

## 2020-09-11 NOTE — TELEPHONE ENCOUNTER
Patient requesting a new rx to filled in to her local pharmacy while waiting the mail order to come. Patient states she have nothing to take since yesterday. Last OV 09/03/20.  Please advise.

## 2020-09-11 NOTE — TELEPHONE ENCOUNTER
----- Message from Ermias Pak sent at 9/11/2020 11:04 AM CDT -----  Regarding: refill  Contact: Mable  Type:  RX Refill Request    Who Called: Mable Kerr  Refill or New Rx:refill   RX Name and Strength:levothyroxine (LEVOXYL) 100 MCG tablet  How is the patient currently taking it? (ex. 1XDay):  Is this a 30 day or 90 day RX:  Preferred Pharmacy with phone number:  [id number] - [facility] [place] - [address] AT [place] [facility]  [address] [place] [address]  Phone: [phone] Fax: [phone]  Local or Mail Order:local   Ordering Provider:Dr Logan   Would the patient rather a call back or a response via [facility]? Call back   Best Call Back Number:   Additional Information: pt states pharmacy did not receive prescription. Pt will like a call to let her know it was sent

## 2020-09-14 DIAGNOSIS — Z87.11 HISTORY OF GASTRIC ULCER: ICD-10-CM

## 2020-09-14 RX ORDER — PANTOPRAZOLE SODIUM 40 MG/1
40 TABLET, DELAYED RELEASE ORAL DAILY
Qty: 90 TABLET | Refills: 2 | Status: CANCELLED | OUTPATIENT
Start: 2020-09-14

## 2020-09-15 ENCOUNTER — PATIENT MESSAGE (OUTPATIENT)
Dept: INTERNAL MEDICINE | Facility: CLINIC | Age: 55
End: 2020-09-15

## 2020-09-15 DIAGNOSIS — Z87.11 HISTORY OF GASTRIC ULCER: ICD-10-CM

## 2020-09-15 RX ORDER — PANTOPRAZOLE SODIUM 40 MG/1
40 TABLET, DELAYED RELEASE ORAL DAILY
Qty: 90 TABLET | Refills: 2 | Status: CANCELLED | OUTPATIENT
Start: 2020-09-15

## 2020-09-16 RX ORDER — PANTOPRAZOLE SODIUM 40 MG/1
40 TABLET, DELAYED RELEASE ORAL DAILY
Qty: 90 TABLET | Refills: 2 | Status: SHIPPED | OUTPATIENT
Start: 2020-09-16 | End: 2020-09-18 | Stop reason: SDUPTHER

## 2020-09-17 ENCOUNTER — PATIENT MESSAGE (OUTPATIENT)
Dept: INTERNAL MEDICINE | Facility: CLINIC | Age: 55
End: 2020-09-17

## 2020-09-18 RX ORDER — PANTOPRAZOLE SODIUM 40 MG/1
40 TABLET, DELAYED RELEASE ORAL DAILY
Qty: 90 TABLET | Refills: 2 | Status: SHIPPED | OUTPATIENT
Start: 2020-09-18 | End: 2021-09-29 | Stop reason: SDUPTHER

## 2020-09-19 RX ORDER — LEVOTHYROXINE SODIUM 125 UG/1
125 TABLET ORAL
Qty: 90 TABLET | Refills: 3 | Status: SHIPPED | OUTPATIENT
Start: 2020-09-19 | End: 2021-01-11 | Stop reason: SDUPTHER

## 2020-09-19 RX ORDER — LEVOTHYROXINE SODIUM 125 UG/1
125 TABLET ORAL
COMMUNITY
End: 2020-09-19 | Stop reason: SDUPTHER

## 2020-09-21 DIAGNOSIS — E03.9 HYPOTHYROIDISM, UNSPECIFIED TYPE: Primary | ICD-10-CM

## 2020-09-29 ENCOUNTER — PATIENT OUTREACH (OUTPATIENT)
Dept: ADMINISTRATIVE | Facility: HOSPITAL | Age: 55
End: 2020-09-29

## 2020-11-13 ENCOUNTER — PATIENT MESSAGE (OUTPATIENT)
Dept: INTERNAL MEDICINE | Facility: CLINIC | Age: 55
End: 2020-11-13

## 2020-11-13 RX ORDER — ROPINIROLE 0.25 MG/1
0.25 TABLET, FILM COATED ORAL NIGHTLY
OUTPATIENT
Start: 2020-11-13

## 2021-01-11 RX ORDER — LEVOTHYROXINE SODIUM 125 UG/1
125 TABLET ORAL
Qty: 90 TABLET | Refills: 0 | Status: SHIPPED | OUTPATIENT
Start: 2021-01-11 | End: 2021-09-29 | Stop reason: SDUPTHER

## 2021-01-21 RX ORDER — LEVOTHYROXINE SODIUM 125 UG/1
125 TABLET ORAL
Qty: 90 TABLET | Refills: 0 | OUTPATIENT
Start: 2021-01-21 | End: 2022-01-16

## 2021-01-27 DIAGNOSIS — Z12.31 OTHER SCREENING MAMMOGRAM: ICD-10-CM

## 2021-03-15 DIAGNOSIS — J30.89 CHRONIC NON-SEASONAL ALLERGIC RHINITIS: ICD-10-CM

## 2021-03-18 RX ORDER — MONTELUKAST SODIUM 10 MG/1
TABLET ORAL
Qty: 90 TABLET | Refills: 1 | Status: SHIPPED | OUTPATIENT
Start: 2021-03-18 | End: 2021-10-28 | Stop reason: SDUPTHER

## 2021-04-29 ENCOUNTER — TELEPHONE (OUTPATIENT)
Dept: PRIMARY CARE CLINIC | Facility: CLINIC | Age: 56
End: 2021-04-29

## 2021-08-10 DIAGNOSIS — Z87.11 HISTORY OF GASTRIC ULCER: ICD-10-CM

## 2021-08-13 ENCOUNTER — PATIENT MESSAGE (OUTPATIENT)
Dept: INTERNAL MEDICINE | Facility: CLINIC | Age: 56
End: 2021-08-13

## 2021-08-13 RX ORDER — PANTOPRAZOLE SODIUM 40 MG/1
TABLET, DELAYED RELEASE ORAL
Qty: 90 TABLET | Refills: 3 | OUTPATIENT
Start: 2021-08-13

## 2021-08-26 ENCOUNTER — OFFICE VISIT (OUTPATIENT)
Dept: FAMILY MEDICINE | Facility: CLINIC | Age: 56
End: 2021-08-26
Payer: COMMERCIAL

## 2021-08-26 VITALS
OXYGEN SATURATION: 96 % | SYSTOLIC BLOOD PRESSURE: 100 MMHG | DIASTOLIC BLOOD PRESSURE: 60 MMHG | HEART RATE: 93 BPM | HEIGHT: 64 IN | BODY MASS INDEX: 44.01 KG/M2 | TEMPERATURE: 98 F | RESPIRATION RATE: 18 BRPM | WEIGHT: 257.81 LBS

## 2021-08-26 DIAGNOSIS — M54.10 RADICULOPATHY OF ARM: ICD-10-CM

## 2021-08-26 DIAGNOSIS — M79.602 LEFT ARM PAIN: Primary | ICD-10-CM

## 2021-08-26 PROBLEM — K12.1 STOMATITIS: Status: RESOLVED | Noted: 2019-07-03 | Resolved: 2021-08-26

## 2021-08-26 PROCEDURE — 96372 PR INJECTION,THERAP/PROPH/DIAG2ST, IM OR SUBCUT: ICD-10-PCS | Mod: S$GLB,,, | Performed by: REGISTERED NURSE

## 2021-08-26 PROCEDURE — 99999 PR PBB SHADOW E&M-EST. PATIENT-LVL IV: ICD-10-PCS | Mod: PBBFAC,,, | Performed by: REGISTERED NURSE

## 2021-08-26 PROCEDURE — 99999 PR PBB SHADOW E&M-EST. PATIENT-LVL IV: CPT | Mod: PBBFAC,,, | Performed by: REGISTERED NURSE

## 2021-08-26 PROCEDURE — 99214 PR OFFICE/OUTPT VISIT, EST, LEVL IV, 30-39 MIN: ICD-10-PCS | Mod: 25,S$GLB,, | Performed by: REGISTERED NURSE

## 2021-08-26 PROCEDURE — 96372 THER/PROPH/DIAG INJ SC/IM: CPT | Mod: S$GLB,,, | Performed by: REGISTERED NURSE

## 2021-08-26 PROCEDURE — 99214 OFFICE O/P EST MOD 30 MIN: CPT | Mod: 25,S$GLB,, | Performed by: REGISTERED NURSE

## 2021-08-26 RX ORDER — DEXAMETHASONE SODIUM PHOSPHATE 4 MG/ML
8 INJECTION, SOLUTION INTRA-ARTICULAR; INTRALESIONAL; INTRAMUSCULAR; INTRAVENOUS; SOFT TISSUE
Status: COMPLETED | OUTPATIENT
Start: 2021-08-26 | End: 2021-08-26

## 2021-08-26 RX ORDER — GABAPENTIN 100 MG/1
100 CAPSULE ORAL 3 TIMES DAILY
Qty: 270 CAPSULE | Refills: 0 | Status: SHIPPED | OUTPATIENT
Start: 2021-08-26

## 2021-08-26 RX ORDER — AMITRIPTYLINE HYDROCHLORIDE 75 MG/1
75 TABLET ORAL NIGHTLY
COMMUNITY
Start: 2021-07-13

## 2021-08-26 RX ADMIN — DEXAMETHASONE SODIUM PHOSPHATE 8 MG: 4 INJECTION, SOLUTION INTRA-ARTICULAR; INTRALESIONAL; INTRAMUSCULAR; INTRAVENOUS; SOFT TISSUE at 02:08

## 2021-08-27 ENCOUNTER — TELEPHONE (OUTPATIENT)
Dept: FAMILY MEDICINE | Facility: CLINIC | Age: 56
End: 2021-08-27

## 2021-09-10 ENCOUNTER — TELEPHONE (OUTPATIENT)
Dept: FAMILY MEDICINE | Facility: CLINIC | Age: 56
End: 2021-09-10

## 2021-09-15 DIAGNOSIS — J30.89 CHRONIC NON-SEASONAL ALLERGIC RHINITIS: ICD-10-CM

## 2021-09-15 RX ORDER — MONTELUKAST SODIUM 10 MG/1
10 TABLET ORAL DAILY
Qty: 90 TABLET | Refills: 1 | OUTPATIENT
Start: 2021-09-15

## 2021-09-29 ENCOUNTER — OFFICE VISIT (OUTPATIENT)
Dept: FAMILY MEDICINE | Facility: CLINIC | Age: 56
End: 2021-09-29
Payer: COMMERCIAL

## 2021-09-29 VITALS
DIASTOLIC BLOOD PRESSURE: 70 MMHG | HEIGHT: 64 IN | HEART RATE: 94 BPM | SYSTOLIC BLOOD PRESSURE: 130 MMHG | OXYGEN SATURATION: 95 % | TEMPERATURE: 97 F | BODY MASS INDEX: 45.56 KG/M2 | RESPIRATION RATE: 18 BRPM | WEIGHT: 266.88 LBS

## 2021-09-29 DIAGNOSIS — Z23 NEED FOR SHINGLES VACCINE: ICD-10-CM

## 2021-09-29 DIAGNOSIS — E66.01 MORBID OBESITY WITH BMI OF 45.0-49.9, ADULT: ICD-10-CM

## 2021-09-29 DIAGNOSIS — Z83.3 FAMILY HISTORY OF DIABETES MELLITUS (DM): ICD-10-CM

## 2021-09-29 DIAGNOSIS — Z23 NEED FOR INFLUENZA VACCINATION: ICD-10-CM

## 2021-09-29 DIAGNOSIS — Z00.00 PREVENTATIVE HEALTH CARE: Primary | ICD-10-CM

## 2021-09-29 DIAGNOSIS — G89.29 CHRONIC RADICULAR PAIN OF LOWER BACK: ICD-10-CM

## 2021-09-29 DIAGNOSIS — K21.9 GASTROESOPHAGEAL REFLUX DISEASE, UNSPECIFIED WHETHER ESOPHAGITIS PRESENT: ICD-10-CM

## 2021-09-29 DIAGNOSIS — E03.9 HYPOTHYROIDISM, UNSPECIFIED TYPE: ICD-10-CM

## 2021-09-29 DIAGNOSIS — M54.16 CHRONIC RADICULAR PAIN OF LOWER BACK: ICD-10-CM

## 2021-09-29 PROBLEM — M79.605 PAIN OF LEFT LOWER EXTREMITY: Status: RESOLVED | Noted: 2017-09-20 | Resolved: 2021-09-29

## 2021-09-29 PROBLEM — J34.89 POSTERIOR RHINORRHEA: Status: RESOLVED | Noted: 2019-07-03 | Resolved: 2021-09-29

## 2021-09-29 PROCEDURE — 99999 PR PBB SHADOW E&M-EST. PATIENT-LVL V: CPT | Mod: PBBFAC,,, | Performed by: REGISTERED NURSE

## 2021-09-29 PROCEDURE — 90750 HZV VACC RECOMBINANT IM: CPT | Mod: S$GLB,,, | Performed by: REGISTERED NURSE

## 2021-09-29 PROCEDURE — 90471 FLU VACCINE (QUAD) GREATER THAN OR EQUAL TO 3YO PRESERVATIVE FREE IM: ICD-10-PCS | Mod: S$GLB,,, | Performed by: REGISTERED NURSE

## 2021-09-29 PROCEDURE — 90472 IMMUNIZATION ADMIN EACH ADD: CPT | Mod: S$GLB,,, | Performed by: REGISTERED NURSE

## 2021-09-29 PROCEDURE — 99396 PREV VISIT EST AGE 40-64: CPT | Mod: 25,S$GLB,, | Performed by: REGISTERED NURSE

## 2021-09-29 PROCEDURE — 90471 IMMUNIZATION ADMIN: CPT | Mod: S$GLB,,, | Performed by: REGISTERED NURSE

## 2021-09-29 PROCEDURE — 90686 FLU VACCINE (QUAD) GREATER THAN OR EQUAL TO 3YO PRESERVATIVE FREE IM: ICD-10-PCS | Mod: S$GLB,,, | Performed by: REGISTERED NURSE

## 2021-09-29 PROCEDURE — 99999 PR PBB SHADOW E&M-EST. PATIENT-LVL V: ICD-10-PCS | Mod: PBBFAC,,, | Performed by: REGISTERED NURSE

## 2021-09-29 PROCEDURE — 90686 IIV4 VACC NO PRSV 0.5 ML IM: CPT | Mod: S$GLB,,, | Performed by: REGISTERED NURSE

## 2021-09-29 PROCEDURE — 90750 ZOSTER RECOMBINANT VACCINE: ICD-10-PCS | Mod: S$GLB,,, | Performed by: REGISTERED NURSE

## 2021-09-29 PROCEDURE — 99396 PR PREVENTIVE VISIT,EST,40-64: ICD-10-PCS | Mod: 25,S$GLB,, | Performed by: REGISTERED NURSE

## 2021-09-29 PROCEDURE — 90472 ZOSTER RECOMBINANT VACCINE: ICD-10-PCS | Mod: S$GLB,,, | Performed by: REGISTERED NURSE

## 2021-09-29 RX ORDER — LEVOTHYROXINE SODIUM 125 UG/1
125 TABLET ORAL
Qty: 90 TABLET | Refills: 0 | Status: SHIPPED | OUTPATIENT
Start: 2021-09-29 | End: 2021-09-29 | Stop reason: SDUPTHER

## 2021-09-29 RX ORDER — PANTOPRAZOLE SODIUM 40 MG/1
40 TABLET, DELAYED RELEASE ORAL DAILY
Qty: 90 TABLET | Refills: 0 | Status: SHIPPED | OUTPATIENT
Start: 2021-09-29 | End: 2021-09-29 | Stop reason: SDUPTHER

## 2021-09-29 RX ORDER — METHYLPREDNISOLONE 4 MG/1
TABLET ORAL
Qty: 1 PACKAGE | Refills: 0 | Status: SHIPPED | OUTPATIENT
Start: 2021-09-29

## 2021-09-29 RX ORDER — PANTOPRAZOLE SODIUM 40 MG/1
40 TABLET, DELAYED RELEASE ORAL DAILY
Qty: 90 TABLET | Refills: 2 | Status: SHIPPED | OUTPATIENT
Start: 2021-09-29 | End: 2022-08-23

## 2021-09-29 RX ORDER — LEVOTHYROXINE SODIUM 125 UG/1
125 TABLET ORAL
Qty: 90 TABLET | Refills: 2 | Status: SHIPPED | OUTPATIENT
Start: 2021-09-29 | End: 2022-08-23

## 2021-10-14 ENCOUNTER — LAB VISIT (OUTPATIENT)
Dept: LAB | Facility: HOSPITAL | Age: 56
End: 2021-10-14
Attending: REGISTERED NURSE
Payer: COMMERCIAL

## 2021-10-14 DIAGNOSIS — Z00.00 PREVENTATIVE HEALTH CARE: ICD-10-CM

## 2021-10-14 DIAGNOSIS — E66.01 MORBID OBESITY WITH BMI OF 45.0-49.9, ADULT: ICD-10-CM

## 2021-10-14 DIAGNOSIS — Z83.3 FAMILY HISTORY OF DIABETES MELLITUS (DM): ICD-10-CM

## 2021-10-14 DIAGNOSIS — E03.9 HYPOTHYROIDISM, UNSPECIFIED TYPE: ICD-10-CM

## 2021-10-14 LAB
ALBUMIN SERPL BCP-MCNC: 3.1 G/DL (ref 3.5–5.2)
ALP SERPL-CCNC: 131 U/L (ref 55–135)
ALT SERPL W/O P-5'-P-CCNC: 10 U/L (ref 10–44)
ANION GAP SERPL CALC-SCNC: 12 MMOL/L (ref 8–16)
AST SERPL-CCNC: 16 U/L (ref 10–40)
BASOPHILS # BLD AUTO: 0.04 K/UL (ref 0–0.2)
BASOPHILS NFR BLD: 0.5 % (ref 0–1.9)
BILIRUB SERPL-MCNC: 0.7 MG/DL (ref 0.1–1)
BUN SERPL-MCNC: 7 MG/DL (ref 6–20)
CALCIUM SERPL-MCNC: 9.4 MG/DL (ref 8.7–10.5)
CHLORIDE SERPL-SCNC: 99 MMOL/L (ref 95–110)
CHOLEST SERPL-MCNC: 181 MG/DL (ref 120–199)
CHOLEST/HDLC SERPL: 3.5 {RATIO} (ref 2–5)
CO2 SERPL-SCNC: 29 MMOL/L (ref 23–29)
CREAT SERPL-MCNC: 0.8 MG/DL (ref 0.5–1.4)
DIFFERENTIAL METHOD: ABNORMAL
EOSINOPHIL # BLD AUTO: 0.3 K/UL (ref 0–0.5)
EOSINOPHIL NFR BLD: 3 % (ref 0–8)
ERYTHROCYTE [DISTWIDTH] IN BLOOD BY AUTOMATED COUNT: 18.2 % (ref 11.5–14.5)
EST. GFR  (AFRICAN AMERICAN): >60 ML/MIN/1.73 M^2
EST. GFR  (NON AFRICAN AMERICAN): >60 ML/MIN/1.73 M^2
ESTIMATED AVG GLUCOSE: 111 MG/DL (ref 68–131)
GLUCOSE SERPL-MCNC: 85 MG/DL (ref 70–110)
HBA1C MFR BLD: 5.5 % (ref 4–5.6)
HCT VFR BLD AUTO: 30.2 % (ref 37–48.5)
HDLC SERPL-MCNC: 51 MG/DL (ref 40–75)
HDLC SERPL: 28.2 % (ref 20–50)
HGB BLD-MCNC: 9 G/DL (ref 12–16)
IMM GRANULOCYTES # BLD AUTO: 0.02 K/UL (ref 0–0.04)
IMM GRANULOCYTES NFR BLD AUTO: 0.2 % (ref 0–0.5)
LDLC SERPL CALC-MCNC: 111 MG/DL (ref 63–159)
LYMPHOCYTES # BLD AUTO: 1.6 K/UL (ref 1–4.8)
LYMPHOCYTES NFR BLD: 18.5 % (ref 18–48)
MCH RBC QN AUTO: 20.3 PG (ref 27–31)
MCHC RBC AUTO-ENTMCNC: 29.8 G/DL (ref 32–36)
MCV RBC AUTO: 68 FL (ref 82–98)
MONOCYTES # BLD AUTO: 0.6 K/UL (ref 0.3–1)
MONOCYTES NFR BLD: 7.4 % (ref 4–15)
NEUTROPHILS # BLD AUTO: 5.9 K/UL (ref 1.8–7.7)
NEUTROPHILS NFR BLD: 70.4 % (ref 38–73)
NONHDLC SERPL-MCNC: 130 MG/DL
NRBC BLD-RTO: 0 /100 WBC
PLATELET # BLD AUTO: 503 K/UL (ref 150–450)
PMV BLD AUTO: 9.3 FL (ref 9.2–12.9)
POTASSIUM SERPL-SCNC: 3.7 MMOL/L (ref 3.5–5.1)
PROT SERPL-MCNC: 7.1 G/DL (ref 6–8.4)
RBC # BLD AUTO: 4.44 M/UL (ref 4–5.4)
SODIUM SERPL-SCNC: 140 MMOL/L (ref 136–145)
TRIGL SERPL-MCNC: 95 MG/DL (ref 30–150)
TSH SERPL DL<=0.005 MIU/L-ACNC: 3.14 UIU/ML (ref 0.4–4)
WBC # BLD AUTO: 8.37 K/UL (ref 3.9–12.7)

## 2021-10-14 PROCEDURE — 36415 COLL VENOUS BLD VENIPUNCTURE: CPT | Mod: PO | Performed by: REGISTERED NURSE

## 2021-10-14 PROCEDURE — 80053 COMPREHEN METABOLIC PANEL: CPT | Performed by: REGISTERED NURSE

## 2021-10-14 PROCEDURE — 83036 HEMOGLOBIN GLYCOSYLATED A1C: CPT | Performed by: REGISTERED NURSE

## 2021-10-14 PROCEDURE — 84443 ASSAY THYROID STIM HORMONE: CPT | Performed by: REGISTERED NURSE

## 2021-10-14 PROCEDURE — 85025 COMPLETE CBC W/AUTO DIFF WBC: CPT | Performed by: REGISTERED NURSE

## 2021-10-14 PROCEDURE — 80061 LIPID PANEL: CPT | Performed by: REGISTERED NURSE

## 2021-10-28 DIAGNOSIS — J30.89 CHRONIC NON-SEASONAL ALLERGIC RHINITIS: ICD-10-CM

## 2021-10-29 RX ORDER — MONTELUKAST SODIUM 10 MG/1
10 TABLET ORAL DAILY
Qty: 90 TABLET | Refills: 1 | Status: SHIPPED | OUTPATIENT
Start: 2021-10-29 | End: 2022-04-11

## 2021-11-03 ENCOUNTER — PATIENT MESSAGE (OUTPATIENT)
Dept: FAMILY MEDICINE | Facility: CLINIC | Age: 56
End: 2021-11-03
Payer: COMMERCIAL

## 2021-11-04 ENCOUNTER — TELEPHONE (OUTPATIENT)
Dept: FAMILY MEDICINE | Facility: CLINIC | Age: 56
End: 2021-11-04
Payer: COMMERCIAL

## 2021-11-04 DIAGNOSIS — M79.7 FIBROMYALGIA: Primary | ICD-10-CM

## 2021-11-16 ENCOUNTER — TELEPHONE (OUTPATIENT)
Dept: FAMILY MEDICINE | Facility: CLINIC | Age: 56
End: 2021-11-16
Payer: COMMERCIAL

## 2022-10-17 DIAGNOSIS — J30.89 CHRONIC NON-SEASONAL ALLERGIC RHINITIS: ICD-10-CM

## 2022-10-17 RX ORDER — MONTELUKAST SODIUM 10 MG/1
TABLET ORAL
Qty: 90 TABLET | Refills: 0 | Status: SHIPPED | OUTPATIENT
Start: 2022-10-17

## 2022-10-17 NOTE — TELEPHONE ENCOUNTER
----- Message from Elizabeth Murphy sent at 10/17/2022  8:50 AM CDT -----  Contact: Mark with [facility] phone [phone]  Requesting an RX refill or new RX.  Is this a refill or new RX: Refill  RX name and strength (copy/paste from chart):  montelukast (SINGULAIR) 10 mg tablet  Is this a 30 day or 90 day RX: 90  Pharmacy name and phone # (copy/paste from chart):    [facility] [address] [address]  Phone: [phone] Fax: [phone]  The doctors have asked that we provide their patients with the following 2 reminders -- prescription refills can take up to 72 hours, and a friendly reminder that in the future you can use your [facility] account to request refills: N/A

## 2023-10-04 ENCOUNTER — PATIENT OUTREACH (OUTPATIENT)
Dept: ADMINISTRATIVE | Facility: HOSPITAL | Age: 58
End: 2023-10-04
Payer: COMMERCIAL

## 2023-10-04 NOTE — PROGRESS NOTES
MAURICIO Panel Clean up report: Per chart review, patient has est care with a new PCP, care team updated.